# Patient Record
Sex: FEMALE | Race: WHITE | NOT HISPANIC OR LATINO
[De-identification: names, ages, dates, MRNs, and addresses within clinical notes are randomized per-mention and may not be internally consistent; named-entity substitution may affect disease eponyms.]

---

## 2017-01-09 ENCOUNTER — TRANSCRIPTION ENCOUNTER (OUTPATIENT)
Age: 57
End: 2017-01-09

## 2017-01-09 PROBLEM — Z00.00 ENCOUNTER FOR PREVENTIVE HEALTH EXAMINATION: Status: ACTIVE | Noted: 2017-01-09

## 2017-01-10 ENCOUNTER — APPOINTMENT (OUTPATIENT)
Dept: PHYSICAL MEDICINE AND REHAB | Facility: CLINIC | Age: 57
End: 2017-01-10

## 2017-01-10 VITALS
OXYGEN SATURATION: 99 % | WEIGHT: 195 LBS | HEART RATE: 66 BPM | HEIGHT: 66 IN | DIASTOLIC BLOOD PRESSURE: 84 MMHG | SYSTOLIC BLOOD PRESSURE: 124 MMHG | BODY MASS INDEX: 31.34 KG/M2

## 2017-01-10 DIAGNOSIS — S13.4XXA SPRAIN OF LIGAMENTS OF CERVICAL SPINE, INITIAL ENCOUNTER: ICD-10-CM

## 2017-01-10 DIAGNOSIS — M47.812 SPONDYLOSIS W/OUT MYELOPATHY OR RADICULOPATHY, CERVICAL REGION: ICD-10-CM

## 2017-01-10 DIAGNOSIS — E78.00 PURE HYPERCHOLESTEROLEMIA, UNSPECIFIED: ICD-10-CM

## 2017-01-10 DIAGNOSIS — Z80.9 FAMILY HISTORY OF MALIGNANT NEOPLASM, UNSPECIFIED: ICD-10-CM

## 2017-01-10 DIAGNOSIS — Z86.39 PERSONAL HISTORY OF OTHER ENDOCRINE, NUTRITIONAL AND METABOLIC DISEASE: ICD-10-CM

## 2017-01-10 DIAGNOSIS — Z78.9 OTHER SPECIFIED HEALTH STATUS: ICD-10-CM

## 2017-01-12 PROBLEM — Z86.39 HISTORY OF DIABETES MELLITUS: Status: RESOLVED | Noted: 2017-01-10 | Resolved: 2017-01-12

## 2017-01-12 PROBLEM — Z78.9 SOCIAL ALCOHOL USE: Status: ACTIVE | Noted: 2017-01-10

## 2017-01-12 PROBLEM — Z80.9 FAMILY HISTORY OF MALIGNANT NEOPLASM: Status: ACTIVE | Noted: 2017-01-10

## 2017-01-12 PROBLEM — E78.00 HIGH CHOLESTEROL: Status: RESOLVED | Noted: 2017-01-10 | Resolved: 2017-01-12

## 2017-01-12 RX ORDER — LEVOTHYROXINE SODIUM 0.09 MG/1
88 TABLET ORAL
Refills: 0 | Status: ACTIVE | COMMUNITY

## 2017-01-12 RX ORDER — ENALAPRIL MALEATE AND HYDROCHLOROTHIAZIDE 10; 25 MG/1; MG/1
10-25 TABLET ORAL
Refills: 0 | Status: ACTIVE | COMMUNITY

## 2017-01-12 RX ORDER — CHROMIUM 200 MCG
1000 TABLET ORAL
Refills: 0 | Status: ACTIVE | COMMUNITY

## 2017-01-12 RX ORDER — METFORMIN HYDROCHLORIDE 500 MG/1
500 TABLET, COATED ORAL
Refills: 0 | Status: ACTIVE | COMMUNITY

## 2017-01-12 RX ORDER — MULTIVIT-MIN/IRON/FOLIC ACID/K 18-600-40
500 CAPSULE ORAL
Refills: 0 | Status: ACTIVE | COMMUNITY

## 2017-01-12 RX ORDER — SITAGLIPTIN 100 MG/1
100 TABLET, FILM COATED ORAL
Refills: 0 | Status: ACTIVE | COMMUNITY

## 2017-01-12 RX ORDER — ROSUVASTATIN CALCIUM 10 MG/1
10 TABLET, FILM COATED ORAL
Refills: 0 | Status: ACTIVE | COMMUNITY

## 2017-01-12 RX ORDER — UBIDECARENONE/VIT E ACET 100MG-5
50 MCG CAPSULE ORAL
Refills: 0 | Status: ACTIVE | COMMUNITY

## 2017-01-18 ENCOUNTER — OUTPATIENT (OUTPATIENT)
Dept: OUTPATIENT SERVICES | Facility: HOSPITAL | Age: 57
LOS: 1 days | End: 2017-01-18
Payer: COMMERCIAL

## 2017-01-18 DIAGNOSIS — M47.812 SPONDYLOSIS WITHOUT MYELOPATHY OR RADICULOPATHY, CERVICAL REGION: ICD-10-CM

## 2017-01-18 DIAGNOSIS — Z51.89 ENCOUNTER FOR OTHER SPECIFIED AFTERCARE: ICD-10-CM

## 2017-03-18 PROCEDURE — 97110 THERAPEUTIC EXERCISES: CPT

## 2017-03-18 PROCEDURE — 97140 MANUAL THERAPY 1/> REGIONS: CPT

## 2017-03-18 PROCEDURE — 97010 HOT OR COLD PACKS THERAPY: CPT

## 2017-03-18 PROCEDURE — 97163 PT EVAL HIGH COMPLEX 45 MIN: CPT

## 2018-01-02 RX ORDER — MELOXICAM 7.5 MG/1
7.5 TABLET ORAL
Qty: 60 | Refills: 2 | Status: ACTIVE | COMMUNITY
Start: 2017-01-10

## 2018-05-23 ENCOUNTER — EMERGENCY (EMERGENCY)
Facility: HOSPITAL | Age: 58
LOS: 1 days | Discharge: HOME | End: 2018-05-23
Attending: EMERGENCY MEDICINE
Payer: COMMERCIAL

## 2018-05-23 VITALS
OXYGEN SATURATION: 98 % | HEIGHT: 65 IN | HEART RATE: 110 BPM | RESPIRATION RATE: 18 BRPM | TEMPERATURE: 98 F | DIASTOLIC BLOOD PRESSURE: 81 MMHG | SYSTOLIC BLOOD PRESSURE: 138 MMHG | WEIGHT: 199.96 LBS

## 2018-05-23 PROCEDURE — 99283 EMERGENCY DEPT VISIT LOW MDM: CPT

## 2018-05-23 PROCEDURE — 99282 EMERGENCY DEPT VISIT SF MDM: CPT

## 2018-05-23 NOTE — ED PROVIDER NOTE - OBJECTIVE STATEMENT
a 56 yo woman with pmhx of HTN, HLD, DM, Hypothyroidism presents to the ED complaining of bleeding from her right leg. The patient states it started today. Pt states she has had a scab/lesion on her right leg for more than 1 year. As per patient states it has not change in color nor in size. She has brought it to the attention of her PMD, pt was suppose to follow up with dermatology. Pt states earlier today she was on the phone, while being distracted she accidently scratched herself. The bleeding was profuse unable to be controlled at home prompt her to come to the ED. The patient denies having any Light headnes, blurry vision ,CP, SOB, abdominal pain nor lower extremity pain

## 2018-05-23 NOTE — ED PROVIDER NOTE - ATTENDING CONTRIBUTION TO CARE
After interviewing the patient, I agree with the HPI as discussed . My personal exam reveals findings consistent with those discussed. Available diagnostic studies were reviewed. I confirm the diagnosis as discussed with the resident The care plan articulated is consistent with our discussion of the patient's particulars. In brief, Hx [ Bleeding from the right leg],Exam [Multiple Varicose veins noted With superficial abrasionTo the right mid thigh Which is now not bleeding ], Plan[A pressure dressing wasOn the areaAnd patient was observed For an hour with no further bleeding . Patient to follow with vascular.]

## 2018-05-23 NOTE — ED PROVIDER NOTE - MEDICAL DECISION MAKING DETAILS
Pt comes with will bleeding from a scab. The wound was clean and dressed. Pt will be sent home with instructions to follow up with her PMD as well as dermatology. Pt comes with will bleeding from a scab. The wound was clean and dressed. Pt will be sent home with instructions to follow up with her PMD, Dermatology as well as vascular

## 2018-05-23 NOTE — ED PROVIDER NOTE - PHYSICAL EXAMINATION
Vital Signs Last 24 Hrs  T(C): 36.8 (23 May 2018 19:55), Max: 36.8 (23 May 2018 19:55)  T(F): 98.2 (23 May 2018 19:55), Max: 98.2 (23 May 2018 19:55)  HR: 110 (23 May 2018 19:55) (110 - 110)  BP: 138/81 (23 May 2018 19:55) (138/81 - 138/81)  RR: 18 (23 May 2018 19:55) (18 - 18)  SpO2: 98% (23 May 2018 19:55) (98% - 98%)

## 2018-05-23 NOTE — ED PROVIDER NOTE - PLAN OF CARE
monitor Patient has been d/c with 4x4 and ace bandaged applied. No active signs of bleeding. Pt has been instructed to follow up with her PMD as well as the dermatologist. Patient has been d/c with 4x4 and ace bandaged applied. No active signs of bleeding. Pt has been instructed to follow up with her PMD, Dermatologist as well as vascular

## 2021-09-09 PROBLEM — E78.5 HYPERLIPIDEMIA, UNSPECIFIED: Chronic | Status: ACTIVE | Noted: 2018-05-23

## 2021-09-09 PROBLEM — I10 ESSENTIAL (PRIMARY) HYPERTENSION: Chronic | Status: ACTIVE | Noted: 2018-05-23

## 2021-09-09 PROBLEM — E03.9 HYPOTHYROIDISM, UNSPECIFIED: Chronic | Status: ACTIVE | Noted: 2018-05-23

## 2021-09-09 PROBLEM — E11.9 TYPE 2 DIABETES MELLITUS WITHOUT COMPLICATIONS: Chronic | Status: ACTIVE | Noted: 2018-05-23

## 2021-09-14 ENCOUNTER — APPOINTMENT (OUTPATIENT)
Dept: ORTHOPEDIC SURGERY | Facility: CLINIC | Age: 61
End: 2021-09-14
Payer: COMMERCIAL

## 2021-09-14 VITALS
DIASTOLIC BLOOD PRESSURE: 80 MMHG | HEIGHT: 65 IN | SYSTOLIC BLOOD PRESSURE: 148 MMHG | TEMPERATURE: 98.1 F | HEART RATE: 107 BPM | BODY MASS INDEX: 31.32 KG/M2 | WEIGHT: 188 LBS

## 2021-09-14 DIAGNOSIS — M62.838 OTHER MUSCLE SPASM: ICD-10-CM

## 2021-09-14 PROCEDURE — 73562 X-RAY EXAM OF KNEE 3: CPT | Mod: RT

## 2021-09-14 PROCEDURE — 99203 OFFICE O/P NEW LOW 30 MIN: CPT

## 2021-09-14 RX ORDER — DICLOFENAC SODIUM 75 MG/1
75 TABLET, DELAYED RELEASE ORAL
Qty: 30 | Refills: 2 | Status: ACTIVE | COMMUNITY
Start: 2021-09-14 | End: 1900-01-01

## 2021-09-14 RX ORDER — METHOCARBAMOL 500 MG/1
500 TABLET, FILM COATED ORAL
Qty: 7 | Refills: 0 | Status: ACTIVE | COMMUNITY
Start: 2021-09-14 | End: 1900-01-01

## 2021-09-14 NOTE — HISTORY OF PRESENT ILLNESS
[de-identified] : 09/14/2021: Patient is a 60 year-old female who presents to the office today for initial evaluation of right knee pain. The pain has been present for the past 3-4 months. She denies any specific injury, but does note that she was climbing up and down ladders while doing some cleaning. Depending on her activities, her pain can be achy, sharp or throbbing. The pain is in the anterior aspect of the knee and also in the lateral aspect of the knee. Spasming of the muscles is noted in the anterior distal quad. She has taken Mobic and Naproxen which did not improve her symptoms and make her slightly drowsy. She also sleeps with a pillow between her knees which is the position of best comfort. She notes difficulty driving because of the pain and has spasming in the distal quad/anterior knee. Sitting, standing, and laying down for long periods of time exacerbate the pain. She notes that she needs to change positions and activities often to prevent the pain from worsening. She has also tried massage therapy that gives some relief, but all in all, she cannot find any relief. She has not done any physical therapy or had any cortisone injections. She does not use any assistive devices and notes a mild limp when she is having a "bad" day. She presents today for further treatment options.

## 2021-09-14 NOTE — PHYSICAL EXAM
[UE/LE] : Sensory: Intact in bilateral upper & lower extremities [ALL] : dorsalis pedis, posterior tibial, femoral, popliteal, and radial 2+ and symmetric bilaterally [Normal Finger/nose] : finger to nose coordination [Normal] : no peripheral adenopathy appreciated [Poor Appearance] : well-appearing [Acute Distress] : not in acute distress [de-identified] : Right Knee Exam\par \par \par FROM to hip\par \par Skin Warm, Dry, no erythema, no lesions \par \par Knee \par stable\par anatomic alignment\par ROM 0-130\par Valgus/Varus Stress intact with mild discomfort \par Effusion - Negative\par Crepitus - Negative \par Patella Grind - Negative \par Patella Apprehension - Negative \par Medial joint line tenderness - Mild\par Lateral Joint line tenderness - Mild\par Teodoro Test - Negative  \par Lachman test - Negative \par Anterior Drawer Test -  Negative \par \par Distal Motor Function intact \par Sensation intact to light touch bilaterally \par Pulses 2+ bilaterally\par \par  [de-identified] : DALTONR [de-identified] : 3 xray views of the right knee were obtained. No fractures or dislocations are noted. There are multiple osteophytes noted throughout the knee, particularly on the anterior aspect of the distal femur and the proximal fibula. Also osteophytes noted on the lateral aspect of the distal femoral condyle. Medial joint space narrowing is noted. Lastly there is a round-shaped opacity noted in the middle of the proximal tibia.

## 2021-09-14 NOTE — ASSESSMENT
[FreeTextEntry1] : Patient with right knee pain consistent with primary osteoarthritis and muscle spasms. The nature of this condition was described at length with the patient she verbalized understanding. I recommend a course of anti-inflammatory with diclofenac, a short course of some muscle relaxants for the muscle spasms. I also recommend a course of physical therapy for strengthening and range of motion exercises. Should the patient not have any improvement in her symptoms over the next few weeks she may return for a cortisone injection. The patient is in agreement with this plan and appreciative of her care.

## 2021-10-23 ENCOUNTER — APPOINTMENT (OUTPATIENT)
Dept: ORTHOPEDIC SURGERY | Facility: CLINIC | Age: 61
End: 2021-10-23
Payer: COMMERCIAL

## 2021-10-23 VITALS
HEART RATE: 103 BPM | TEMPERATURE: 98.1 F | WEIGHT: 188 LBS | HEIGHT: 65 IN | SYSTOLIC BLOOD PRESSURE: 134 MMHG | DIASTOLIC BLOOD PRESSURE: 80 MMHG | BODY MASS INDEX: 31.32 KG/M2

## 2021-10-23 PROCEDURE — 99213 OFFICE O/P EST LOW 20 MIN: CPT

## 2021-10-23 RX ORDER — CELECOXIB 200 MG/1
200 CAPSULE ORAL
Qty: 30 | Refills: 0 | Status: ACTIVE | COMMUNITY
Start: 2021-10-23 | End: 1900-01-01

## 2021-10-23 RX ORDER — HYALURONATE SOD, CROSS-LINKED 30 MG/3 ML
30 SYRINGE (ML) INTRAARTICULAR
Qty: 1 | Refills: 0 | Status: ACTIVE | OUTPATIENT
Start: 2021-10-23

## 2021-10-23 NOTE — PHYSICAL EXAM
[de-identified] : Right knee Physical Examination:\par \par General: Alert and oriented x3.  In no acute distress.  Pleasant in nature with a normal affect.  No apparent respiratory distress. \par \par Erythema, Warmth, Rubor: Negative\par Swelling/Edema: Positive\par ROM: Lacking about 5 degrees of extension with a hard endpoint and flexes to 120 degrees, pain with hyperflexion. \par Teodoro's Test: Positive \par Medial Joint Line TTP: Positive\par Lateral Joint Line TTP: Negative\par Lachman Exam/Anterior Drawer/Pivot Shift Test: Negative \par MCL Pain: Negative\par LCL Pain: Negative\par Iliotibial Band Pain: Negative\par Patellofemoral Joint Pain: Positive with severe crepitus in the knee.\par Patellar Tendon TTP: Negative\par Pes Anserinus TTP: Negative\par Homans Sign: Negative\par Posterior Knee Pain: Negative\par Neuro: Intact with no sensory or motor deficits [de-identified] : Previous x-rays reviewed of the right knee showed severe patellofemoral osteoarthritis.

## 2021-10-23 NOTE — DISCUSSION/SUMMARY
[de-identified] : At this point in time being that the patient is a diabetic I want to hold off on cortisone injections for her right knee. I prescribed her Celebrex 200 mg to take daily to help with the pain in her knee. I gave her a knee runner brace for support and compression of the knee as she states she does not trust the knee at this time and walking causes her severe pain. I ordered her hyaluronic acid injections to be authorized by her insurance company. If the injections are authorized she will come back into office to have them injected into her right knee. All of her questions were answered and she understood the treatment course at this time.

## 2021-10-23 NOTE — HISTORY OF PRESENT ILLNESS
[de-identified] : The patient is a 61-year-old female who presents for a follow-up of her right knee. She was diagnosed with osteoarthritis of the right knee. She is having severe pain in the right knee for the past couple of weeks, causing her to lose sleep at night. She is walking with a limp. Her pain scale 6 out of 10 constant. She has no numbness or tingling going down her legs.

## 2021-11-13 ENCOUNTER — APPOINTMENT (OUTPATIENT)
Dept: ORTHOPEDIC SURGERY | Facility: CLINIC | Age: 61
End: 2021-11-13
Payer: COMMERCIAL

## 2021-11-13 DIAGNOSIS — M17.11 UNILATERAL PRIMARY OSTEOARTHRITIS, RIGHT KNEE: ICD-10-CM

## 2021-11-13 DIAGNOSIS — M25.561 PAIN IN RIGHT KNEE: ICD-10-CM

## 2021-11-13 PROCEDURE — 20610 DRAIN/INJ JOINT/BURSA W/O US: CPT | Mod: RT

## 2021-11-13 NOTE — HISTORY OF PRESENT ILLNESS
[de-identified] : The patient is a 61-year-old female who presents with right knee osteoarthritis and is here to receive a hyaluronic acid injection using Gel-One.  Her pain scale is 4 out of 10.  She denies locking and catching of her right knee.  She has no numbness or tingling going down her right leg.

## 2021-11-13 NOTE — PROCEDURE
[de-identified] : Laterality: Right knee\par \par The risks and benefits of the injection were reviewed with the patient today in office and all their questions were answered.  The injection site was the anterolateral aspect of the knee with the patient sitting up, knees flexed to 90 degrees.  Prior to giving the injection the injection site was prepped with Chloraprep and a sterile field was created.  Sterile technique was carried out throughout the procedure.  After verbal consent from the patient we went ahead and injected the right knee today with Gel-One using a 22 nikita 1.5" needle.  The medication was placed into the knee without complication.  Post injection the patient reported no pain, had a normal gait and good motion of the knee.  The patient denied numbness and tingling going down their leg.  There were no complications during the procedure.\par \par Lot #: [id number]\par Exp: 2023-06-22

## 2021-11-13 NOTE — DISCUSSION/SUMMARY
[de-identified] : Assessment: Right knee Osteoarthritis/Pain\par \par Plan:\par 1. RICE Therapy.\par 2. Antiinflammatories/Tylenol as needed for pain of discomfort. \par 3. The patient was advised to rest the knee for 24-48 hours post injection.  The patient is able to resume normal activities in 24-48 hours post injection if the patient is experiencing minimal to no pain. \par 4. Continue with a home exercise/stretching program. \par 5. All the patients questions were answered.  If the patient is experiencing any problems or has concerns they were advised to call the office or make an appointment to come in to be evaluated.\par

## 2021-11-13 NOTE — PHYSICAL EXAM
[de-identified] : Right knee Physical Examination:\par \par General: Alert and oriented x3.  In no acute distress.  Pleasant in nature with a normal affect.  No apparent respiratory distress. \par \par Erythema, Warmth, Rubor: Negative\par Swelling/Edema: Positive\par ROM:0 to120 degrees, pain with hyperflexion. \par Teodoro's Test: Positive \par Medial Joint Line TTP: Positive\par Lateral Joint Line TTP: Negative\par Lachman Exam/Anterior Drawer/Pivot Shift Test: Negative \par MCL Pain: Negative\par LCL Pain: Negative\par Iliotibial Band Pain: Negative\par Patellofemoral Joint Pain: Positive with severe crepitus in the knee.\par Patellar Tendon TTP: Negative\par Pes Anserinus TTP: Negative\par Homans Sign: Negative\par Posterior Knee Pain: Negative\par Neuro: Intact with no sensory or motor deficits [de-identified] : Previous x-rays reviewed of the right knee showed severe patellofemoral osteoarthritis.

## 2022-04-11 NOTE — ED PROVIDER NOTE - EXITCARE/DISCHARGE INSTRUCTIONS
Launch [employer] and print the 'Prescriptions from this Visit' Report Size Of Lesion: .3x.3
